# Patient Record
Sex: FEMALE | URBAN - METROPOLITAN AREA
[De-identification: names, ages, dates, MRNs, and addresses within clinical notes are randomized per-mention and may not be internally consistent; named-entity substitution may affect disease eponyms.]

---

## 2018-08-28 ENCOUNTER — OFFICE VISIT (OUTPATIENT)
Dept: URBAN - METROPOLITAN AREA CLINIC 48 | Facility: CLINIC | Age: 33
End: 2018-08-28
Payer: OTHER GOVERNMENT

## 2018-08-28 DIAGNOSIS — H18.831 RECURRENT EROSION OF CORNEA, RIGHT EYE: Primary | ICD-10-CM

## 2018-08-28 PROCEDURE — 92012 INTRM OPH EXAM EST PATIENT: CPT | Performed by: OPHTHALMOLOGY

## 2018-08-28 RX ORDER — OFLOXACIN 3 MG/ML
0.3 % SOLUTION/ DROPS OPHTHALMIC
Qty: 1 | Refills: 1 | Status: ACTIVE
Start: 2018-08-28

## 2018-08-28 ASSESSMENT — INTRAOCULAR PRESSURE
OD: 18
OS: 18

## 2018-08-28 NOTE — IMPRESSION/PLAN
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 if no improvement with current treatment to do epithelial debriment OD then BCL Plan:  britta 128 qhs OD, patient to start ofloxacin as needed for recurrent pain in OD BID , patient to use PF lubricating drops qid OD

rtc 1 month follow up

## 2018-10-26 ENCOUNTER — OFFICE VISIT (OUTPATIENT)
Dept: URBAN - METROPOLITAN AREA CLINIC 48 | Facility: CLINIC | Age: 33
End: 2018-10-26
Payer: OTHER GOVERNMENT

## 2018-10-26 DIAGNOSIS — H04.123 DRY EYE SYNDROME OF BILATERAL LACRIMAL GLANDS: Primary | ICD-10-CM

## 2018-10-26 PROCEDURE — 92012 INTRM OPH EXAM EST PATIENT: CPT | Performed by: OPHTHALMOLOGY

## 2018-10-26 ASSESSMENT — INTRAOCULAR PRESSURE
OS: 16
OD: 15

## 2018-10-26 NOTE — IMPRESSION/PLAN
Impression: Dry eye syndrome of bilateral lacrimal glands: H04.123. Plan: Patient healing from recurrent erosion OD,  Dry eye worst OD > OS Patient to discontinue OFL unless there is redness and severe decrese vision PAtient to use pf AFT 3-12 times a day OU

RTC 3-5 weeks  follow up